# Patient Record
Sex: MALE | Race: WHITE | NOT HISPANIC OR LATINO | Employment: OTHER | URBAN - METROPOLITAN AREA
[De-identification: names, ages, dates, MRNs, and addresses within clinical notes are randomized per-mention and may not be internally consistent; named-entity substitution may affect disease eponyms.]

---

## 2021-05-12 ENCOUNTER — IMMUNIZATION (OUTPATIENT)
Dept: FAMILY PLANNING/WOMEN'S HEALTH CLINIC | Facility: IMMUNIZATION CENTER | Age: 41
End: 2021-05-12
Payer: COMMERCIAL

## 2021-05-12 DIAGNOSIS — Z23 ENCOUNTER FOR VACCINATION: Primary | ICD-10-CM

## 2021-05-12 PROCEDURE — 91300 PFIZER SARS-COV-2 VACCINE: CPT | Performed by: INTERNAL MEDICINE

## 2021-05-12 PROCEDURE — 0001A PFIZER SARS-COV-2 VACCINE: CPT | Performed by: INTERNAL MEDICINE

## 2023-07-08 ENCOUNTER — HOSPITAL ENCOUNTER (EMERGENCY)
Facility: MEDICAL CENTER | Age: 43
End: 2023-07-08
Attending: EMERGENCY MEDICINE
Payer: COMMERCIAL

## 2023-07-08 VITALS
TEMPERATURE: 98.4 F | BODY MASS INDEX: 29.69 KG/M2 | RESPIRATION RATE: 15 BRPM | WEIGHT: 238.76 LBS | HEIGHT: 75 IN | HEART RATE: 79 BPM | OXYGEN SATURATION: 97 % | SYSTOLIC BLOOD PRESSURE: 129 MMHG | DIASTOLIC BLOOD PRESSURE: 98 MMHG

## 2023-07-08 DIAGNOSIS — S61.212A LACERATION OF RIGHT MIDDLE FINGER WITHOUT FOREIGN BODY WITHOUT DAMAGE TO NAIL, INITIAL ENCOUNTER: ICD-10-CM

## 2023-07-08 PROCEDURE — 304999 HCHG REPAIR-SIMPLE/INTERMED LEVEL 1

## 2023-07-08 PROCEDURE — 90471 IMMUNIZATION ADMIN: CPT

## 2023-07-08 PROCEDURE — 303747 HCHG EXTRA SUTURE

## 2023-07-08 PROCEDURE — 700111 HCHG RX REV CODE 636 W/ 250 OVERRIDE (IP): Performed by: EMERGENCY MEDICINE

## 2023-07-08 PROCEDURE — 99281 EMR DPT VST MAYX REQ PHY/QHP: CPT

## 2023-07-08 PROCEDURE — 90715 TDAP VACCINE 7 YRS/> IM: CPT | Performed by: EMERGENCY MEDICINE

## 2023-07-08 RX ADMIN — CLOSTRIDIUM TETANI TOXOID ANTIGEN (FORMALDEHYDE INACTIVATED), CORYNEBACTERIUM DIPHTHERIAE TOXOID ANTIGEN (FORMALDEHYDE INACTIVATED), BORDETELLA PERTUSSIS TOXOID ANTIGEN (GLUTARALDEHYDE INACTIVATED), BORDETELLA PERTUSSIS FILAMENTOUS HEMAGGLUTININ ANTIGEN (FORMALDEHYDE INACTIVATED), BORDETELLA PERTUSSIS PERTACTIN ANTIGEN, AND BORDETELLA PERTUSSIS FIMBRIAE 2/3 ANTIGEN 0.5 ML: 5; 2; 2.5; 5; 3; 5 INJECTION, SUSPENSION INTRAMUSCULAR at 12:45

## 2023-07-08 NOTE — ED NOTES
Pt given d/c paperwork and f/u instruction; [t verbalized understanding all information given. Pt ambulated out of the ER w/o difficulty w/ spouse

## 2023-07-08 NOTE — DISCHARGE INSTRUCTIONS
Return to the ER for drainage, redness, increasing pain, or other concerns    Follow-up with a primary care provider or urgent care for suture removal in 1 week

## 2023-07-08 NOTE — ED NOTES
Patient complains of a small digit laceration. Patient states that they sliced their finger on a piece of metal in their Friday at 1030 today. Patient wrapped their finger at home with cause. Patient has cms in tact.

## 2023-07-08 NOTE — ED PROVIDER NOTES
"ED Provider Note    CHIEF COMPLAINT  Chief Complaint   Patient presents with    Hand Laceration     R side, small lac        EXTERNAL RECORDS REVIEWED  Outpatient Notes none available    HPI/ROS  LIMITATION TO HISTORY   Select: : None  OUTSIDE HISTORIAN(S):  Significant other patient's wife is here with him    Alfredo Collins is a 42 y.o. male who presents for right hand laceration.    Patient states he was cleaning out the fridge and used his hand to swipe the back of the shelf clear, cutting it on a very sharp metal piece.  He states there was bleeding.  He does not suspect foreign body.  He declines anything for pain at this time.  He does not know when his last tetanus shot was.  He reports mild numbness/tingling to the right third fingertip distal to the laceration.  He does not take anticoagulants.    PAST MEDICAL HISTORY     Denies    SURGICAL HISTORY  patient denies any surgical history    FAMILY HISTORY  History reviewed. No pertinent family history.    SOCIAL HISTORY  Social History     Tobacco Use    Smoking status: Never    Smokeless tobacco: Never   Substance and Sexual Activity    Alcohol use: Not Currently    Drug use: Never    Sexual activity: Not on file       CURRENT MEDICATIONS  No anticoagulants      ALLERGIES  Denies    PHYSICAL EXAM  VITAL SIGNS: BP (!) 129/98   Pulse 79   Temp 36.9 °C (98.4 °F) (Temporal)   Resp 15   Ht 1.905 m (6' 3\")   Wt 108 kg (238 lb 12.1 oz)   SpO2 97%   BMI 29.84 kg/m²    General:  WDWN male, nontoxic appearing in NAD; A+Ox3; V/S as above  Skin: warm and dry; good color; no rash  HEENT: NCAT; EOMs intact; PERRL; no scleral icterus   Neck: FROM  Extremities: LOW x 4; 0.5 cm linear laceration across the palmar aspect of the right middle finger at the DIP crease; no foreign body, no active bleeding  Neurologic: CNs III-XII grossly intact; speech clear  Psychiatric: Appropriate affect, normal mood      DIAGNOSTIC STUDIES / PROCEDURES    Laceration Repair Procedure " Note    Indication: Laceration    Procedure: Patient washed his hand with soap and water at the sink.  The patient was placed in the appropriate position with his right hand on his thigh.  I explained the pros and cons of administering an anesthetic.  Given that we will be doing two sutures, he agreed that not injecting lidocaine would likely be tolerable, which it was.  Patient able to flex the tip of the right middle finger with good strength.  No tendon involvement.  No foreign body.  The laceration was closed with 5-0 Ethilon using interrupted sutures. There were no additional lacerations requiring repair. The wound area was then dressed with bacitracin and a bandage.      Total repaired wound length: 0.5 cm.     Other Items: Suture count: 2    The patient tolerated the procedure well.    Complications: None      COURSE & MEDICAL DECISION MAKING    ED Observation Status? No; Patient does not meet criteria for ED Observation.     INITIAL ASSESSMENT, COURSE AND PLAN  Care Narrative: Patient seen for right dominant hand middle finger laceration at the DIP joint.  No tendon involvement suspected.  No foreign body suspected.  This was a fairly superficial laceration but just at the crease where there is movement.  2 sutures placed without difficulty.  Patient will be bandaged to limit range of motion and to speed healing.  Sutures to be removed in 5 to 7 days.  Return precautions discussed.    FINAL DIAGNOSIS  1. Laceration of right middle finger without foreign body without damage to nail, initial encounter           Electronically signed by: Ember Gonzalez M.D., 7/8/2023 12:05 PM

## 2023-07-08 NOTE — ED TRIAGE NOTES
"Chief Complaint   Patient presents with    Hand Laceration     R side, small lac      BP (!) 129/98   Pulse 79   Temp 36.9 °C (98.4 °F) (Temporal)   Resp 15   Ht 1.905 m (6' 3\")   Wt 108 kg (238 lb 12.1 oz)   SpO2 97%   BMI 29.84 kg/m²     Pt arrived w/ above concern  "